# Patient Record
Sex: FEMALE | Race: WHITE | NOT HISPANIC OR LATINO | Employment: FULL TIME | URBAN - METROPOLITAN AREA
[De-identification: names, ages, dates, MRNs, and addresses within clinical notes are randomized per-mention and may not be internally consistent; named-entity substitution may affect disease eponyms.]

---

## 2022-02-25 ENCOUNTER — OFFICE VISIT (OUTPATIENT)
Dept: OBSTETRICS AND GYNECOLOGY | Facility: CLINIC | Age: 32
End: 2022-02-25

## 2022-02-25 VITALS
HEIGHT: 69 IN | BODY MASS INDEX: 16.59 KG/M2 | HEART RATE: 93 BPM | SYSTOLIC BLOOD PRESSURE: 107 MMHG | WEIGHT: 112 LBS | DIASTOLIC BLOOD PRESSURE: 69 MMHG

## 2022-02-25 DIAGNOSIS — N93.9 ABNORMAL UTERINE BLEEDING (AUB): Primary | ICD-10-CM

## 2022-02-25 DIAGNOSIS — R30.0 DYSURIA: ICD-10-CM

## 2022-02-25 DIAGNOSIS — Z30.013 ENCOUNTER FOR INITIAL PRESCRIPTION OF INJECTABLE CONTRACEPTIVE: ICD-10-CM

## 2022-02-25 DIAGNOSIS — N94.6 DYSMENORRHEA: ICD-10-CM

## 2022-02-25 LAB
BILIRUB BLD-MCNC: ABNORMAL MG/DL
C TRACH RRNA CVX QL NAA+PROBE: NOT DETECTED
CLARITY, POC: CLEAR
COLOR UR: ABNORMAL
GLUCOSE UR STRIP-MCNC: NEGATIVE MG/DL
KETONES UR QL: ABNORMAL
LEUKOCYTE EST, POC: ABNORMAL
N GONORRHOEA RRNA SPEC QL NAA+PROBE: NOT DETECTED
NITRITE UR-MCNC: POSITIVE MG/ML
PH UR: 5 [PH] (ref 5–8)
PROT UR STRIP-MCNC: ABNORMAL MG/DL
RBC # UR STRIP: ABNORMAL /UL
SP GR UR: 1.02 (ref 1–1.03)
UROBILINOGEN UR QL: NORMAL

## 2022-02-25 PROCEDURE — 84702 CHORIONIC GONADOTROPIN TEST: CPT | Performed by: NURSE PRACTITIONER

## 2022-02-25 PROCEDURE — 87591 N.GONORRHOEAE DNA AMP PROB: CPT | Performed by: NURSE PRACTITIONER

## 2022-02-25 PROCEDURE — 87491 CHLMYD TRACH DNA AMP PROBE: CPT | Performed by: NURSE PRACTITIONER

## 2022-02-25 PROCEDURE — 99204 OFFICE O/P NEW MOD 45 MIN: CPT | Performed by: NURSE PRACTITIONER

## 2022-02-25 RX ORDER — BUSPIRONE HYDROCHLORIDE 5 MG/1
TABLET ORAL
COMMUNITY

## 2022-02-25 RX ORDER — OMEPRAZOLE 40 MG/1
CAPSULE, DELAYED RELEASE ORAL
COMMUNITY

## 2022-02-25 RX ORDER — NITROFURANTOIN 25; 75 MG/1; MG/1
100 CAPSULE ORAL 2 TIMES DAILY
Qty: 10 CAPSULE | Refills: 0 | Status: SHIPPED | OUTPATIENT
Start: 2022-02-25 | End: 2022-03-02

## 2022-02-25 RX ORDER — MEDROXYPROGESTERONE ACETATE 150 MG/ML
150 INJECTION, SUSPENSION INTRAMUSCULAR
Qty: 1 EACH | Refills: 3 | Status: SHIPPED | OUTPATIENT
Start: 2022-02-25 | End: 2023-02-07 | Stop reason: SDUPTHER

## 2022-02-25 NOTE — PROGRESS NOTES
"GYN Problem/Follow Up Visit    Chief Complaint   Patient presents with   • Menstrual Problem     irreg, long menses, burning           HPI  Lakesha Mccall is a 31 y.o. female, , who presents for menses frequent and lasting longer for past 9 months.   Menses q 14- 30 days, lasting 5-9, change products q 3 hours- super tampon, on heaviest days, Menstrual cramps increasing. Desires contraception.    Additional OB/GYN History   Patient's last menstrual period was 2022 (exact date).  Current contraception: contraceptive methods: Condoms  Desires to: start contraception  Allergies : Lexapro [escitalopram]     The additional following portions of the patient's history were reviewed and updated as appropriate: allergies, current medications, past family history, past medical history, past social history, past surgical history and problem list.    Review of Systems    I have reviewed and agree with the HPI, ROS, and historical information as entered above. Dionna Helms, APRN    Objective   /69   Pulse 93   Ht 175.3 cm (69\")   Wt 50.8 kg (112 lb)   LMP 2022 (Exact Date)   BMI 16.54 kg/m²     Physical Exam  Exam conducted with a chaperone present.   Constitutional:       Appearance: Normal appearance.   Cardiovascular:      Rate and Rhythm: Normal rate and regular rhythm.   Genitourinary:     General: Normal vulva.      Vagina: Normal.      Cervix: Normal.      Uterus: Normal.       Adnexa: Right adnexa normal and left adnexa normal.      Comments: menstruating  Skin:     General: Skin is warm and dry.   Neurological:      Mental Status: She is alert.          Assessment and Plan    Diagnoses and all orders for this visit:    1. Abnormal uterine bleeding (AUB) (Primary)  -     Chlamydia trachomatis, Neisseria gonorrhoeae, PCR - Swab, Cervix  -     HCG, B-subunit, Quantitative    2. Dysmenorrhea    3. Dysuria  -     POC Urinalysis Dipstick  -     Urine Culture - Urine, Urine, Clean Catch    4. " Encounter for initial prescription of injectable contraceptive  -     HCG, B-subunit, Quantitative    Other orders  -     medroxyPROGESTERone (DEPO-PROVERA) 150 MG/ML injection; Inject 1 mL into the appropriate muscle as directed by prescriber Every 3 (Three) Months.  Dispense: 1 each; Refill: 3  -     nitrofurantoin, macrocrystal-monohydrate, (Macrobid) 100 MG capsule; Take 1 capsule by mouth 2 (Two) Times a Day for 5 days.  Dispense: 10 capsule; Refill: 0      Color   Date Value Ref Range Status   02/25/2022 Kristi Yellow, Straw, Dark Yellow, Kristi Final     Clarity, UA   Date Value Ref Range Status   02/25/2022 Clear Clear Final     Specific Gravity    Date Value Ref Range Status   02/25/2022 1.020 (A) 1.005 - 1.030 Final     pH, Urine   Date Value Ref Range Status   02/25/2022 5.0 5.0 - 8.0 Final     Leukocytes   Date Value Ref Range Status   02/25/2022 Large (3+) (A) Negative Final     Nitrite, UA   Date Value Ref Range Status   02/25/2022 Positive (A) Negative Final     Protein, POC   Date Value Ref Range Status   02/25/2022 Trace (A) Negative mg/dL Final     Glucose, UA   Date Value Ref Range Status   02/25/2022 Negative Negative, 1000 mg/dL (3+) mg/dL Final     Ketones, UA   Date Value Ref Range Status   02/25/2022 1+ (A) Negative Final     Urobilinogen, UA   Date Value Ref Range Status   02/25/2022 Normal Normal Final     Bilirubin   Date Value Ref Range Status   02/25/2022 Small (1+) (A) Negative Final     Blood, UA   Date Value Ref Range Status   02/25/2022 3+ (A) Negative Final        Counseling:  • All BC Options R/B/A/SE/E of each  • Safe Sex/Condoms  • OCP/Hormone use risk TERRENCE   • No intercourse until after depo injection Monday, Pt lives 1 hour away. Counseled to avoid intercourse after BHCG drawn today.  • Consider pelvic ultrasound for persistent or worsening menstrual cramps        She understands the importance of having the above orders performed in a timely fashion.  The risks of not performing  them include, but are not limited to, cancer and/or subsequent increase in morbidity and/or mortality.  She is encouraged to review her results online and/or contact or office if she has questions.     Follow Up:  Return for well woman exam.        Dionna Helms, MILAGRO  02/25/2022

## 2022-02-27 LAB — HCG INTACT+B SERPL-ACNC: <1 MIU/ML

## 2022-02-28 ENCOUNTER — TELEPHONE (OUTPATIENT)
Dept: OBSTETRICS AND GYNECOLOGY | Facility: CLINIC | Age: 32
End: 2022-02-28

## 2022-02-28 ENCOUNTER — CLINICAL SUPPORT (OUTPATIENT)
Dept: OBSTETRICS AND GYNECOLOGY | Facility: CLINIC | Age: 32
End: 2022-02-28

## 2022-02-28 DIAGNOSIS — Z30.42 ON DEPO-PROVERA FOR CONTRACEPTION: Primary | ICD-10-CM

## 2022-02-28 PROCEDURE — 96372 THER/PROPH/DIAG INJ SC/IM: CPT | Performed by: NURSE PRACTITIONER

## 2022-02-28 RX ORDER — MEDROXYPROGESTERONE ACETATE 150 MG/ML
150 INJECTION, SUSPENSION INTRAMUSCULAR ONCE
Status: COMPLETED | OUTPATIENT
Start: 2022-02-28 | End: 2022-02-28

## 2022-02-28 RX ADMIN — MEDROXYPROGESTERONE ACETATE 150 MG: 150 INJECTION, SUSPENSION INTRAMUSCULAR at 16:50

## 2022-02-28 NOTE — TELEPHONE ENCOUNTER
Patient called this pm.  She states was seen on 2-25-22 and had lab work done and received a Rx for Depo.  She was told to come back today and get the shot.  Patient states no way she can make it here today because of work.  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  Can she have someone give her the shot?

## 2022-02-28 NOTE — TELEPHONE ENCOUNTER
If pt is not going to be able to come in for depo injections please cancel refills at pharmacy. May also schedule a fu to discuss alternative treatment that does not require q 3 month injections if that works better for her.

## 2022-02-28 NOTE — PROGRESS NOTES
Patient verifies she has had no intercourse since BHCG done on 2/25/22.  Patient signed consent that she agrees to medroxyprogesterone injection.

## 2022-05-27 ENCOUNTER — CLINICAL SUPPORT (OUTPATIENT)
Dept: OBSTETRICS AND GYNECOLOGY | Facility: CLINIC | Age: 32
End: 2022-05-27

## 2022-05-27 DIAGNOSIS — Z30.42 SURVEILLANCE OF CONTRACEPTIVE INJECTION: Primary | ICD-10-CM

## 2022-05-27 PROCEDURE — 96372 THER/PROPH/DIAG INJ SC/IM: CPT | Performed by: NURSE PRACTITIONER

## 2022-05-27 RX ORDER — MEDROXYPROGESTERONE ACETATE 150 MG/ML
150 INJECTION, SUSPENSION INTRAMUSCULAR ONCE
Status: COMPLETED | OUTPATIENT
Start: 2022-05-27 | End: 2022-05-27

## 2022-05-27 RX ADMIN — MEDROXYPROGESTERONE ACETATE 150 MG: 150 INJECTION, SUSPENSION INTRAMUSCULAR at 16:00

## 2022-05-27 NOTE — PROGRESS NOTES
Pt received Depo today  Administered in Right Deltoid  Due between: August 12-26, 2022  Pap Due: Informed pt to schedule WWE

## 2022-08-26 ENCOUNTER — CLINICAL SUPPORT (OUTPATIENT)
Dept: OBSTETRICS AND GYNECOLOGY | Facility: CLINIC | Age: 32
End: 2022-08-26

## 2022-08-26 DIAGNOSIS — Z30.42 SURVEILLANCE OF CONTRACEPTIVE INJECTION: Primary | ICD-10-CM

## 2022-08-26 PROCEDURE — 96372 THER/PROPH/DIAG INJ SC/IM: CPT | Performed by: NURSE PRACTITIONER

## 2022-08-26 RX ORDER — MEDROXYPROGESTERONE ACETATE 150 MG/ML
150 INJECTION, SUSPENSION INTRAMUSCULAR
Status: COMPLETED | OUTPATIENT
Start: 2022-08-26 | End: 2022-08-26

## 2022-08-26 RX ADMIN — MEDROXYPROGESTERONE ACETATE 150 MG: 150 INJECTION, SUSPENSION INTRAMUSCULAR at 15:44

## 2022-08-26 NOTE — PROGRESS NOTES
Patient received Depo today  Administered in Left Deltoid  Next injection due between:NOv 11th-25th   Beta HCG: was done and was NEGATIVE   Urine HCG:was not done  [place] in the last two weeks(NA if pt has been receiving depo):not applicable  Last yearly exam/Last Pap Smear: Informed Pt. To schedule WWE

## 2022-11-18 ENCOUNTER — CLINICAL SUPPORT (OUTPATIENT)
Dept: OBSTETRICS AND GYNECOLOGY | Facility: CLINIC | Age: 32
End: 2022-11-18

## 2022-11-18 DIAGNOSIS — Z30.42 SURVEILLANCE OF CONTRACEPTIVE INJECTION: Primary | ICD-10-CM

## 2022-11-18 PROCEDURE — 96372 THER/PROPH/DIAG INJ SC/IM: CPT | Performed by: NURSE PRACTITIONER

## 2022-11-18 RX ORDER — MEDROXYPROGESTERONE ACETATE 150 MG/ML
150 INJECTION, SUSPENSION INTRAMUSCULAR
Status: SHIPPED | OUTPATIENT
Start: 2022-11-18

## 2022-11-18 RX ADMIN — MEDROXYPROGESTERONE ACETATE 150 MG: 150 INJECTION, SUSPENSION INTRAMUSCULAR at 16:07

## 2022-11-18 NOTE — PROGRESS NOTES
Patient received Depo today  Administered in Right Deltoid  Next injection due between: Feb 3rd - Feb 17th  Beta HCG: was not done  Urine HCG:was not done  [place] in the last two weeks(NA if pt has been receiving depo):not applicable  Last yearly exam/Last Pap Smear: UNKNOWN.

## 2022-12-16 ENCOUNTER — OFFICE VISIT (OUTPATIENT)
Dept: OBSTETRICS AND GYNECOLOGY | Facility: CLINIC | Age: 32
End: 2022-12-16

## 2022-12-16 VITALS
SYSTOLIC BLOOD PRESSURE: 102 MMHG | HEIGHT: 69 IN | WEIGHT: 120 LBS | DIASTOLIC BLOOD PRESSURE: 76 MMHG | BODY MASS INDEX: 17.77 KG/M2

## 2022-12-16 DIAGNOSIS — Z01.419 WELL WOMAN EXAM WITH ROUTINE GYNECOLOGICAL EXAM: Primary | ICD-10-CM

## 2022-12-16 DIAGNOSIS — Z80.3 FAMILY HISTORY OF BREAST CANCER: ICD-10-CM

## 2022-12-16 PROCEDURE — 99385 PREV VISIT NEW AGE 18-39: CPT | Performed by: OBSTETRICS & GYNECOLOGY

## 2022-12-16 PROCEDURE — 87624 HPV HI-RISK TYP POOLED RSLT: CPT | Performed by: OBSTETRICS & GYNECOLOGY

## 2022-12-16 PROCEDURE — G0123 SCREEN CERV/VAG THIN LAYER: HCPCS | Performed by: OBSTETRICS & GYNECOLOGY

## 2022-12-16 RX ORDER — CEFDINIR 300 MG/1
1 CAPSULE ORAL EVERY 12 HOURS
COMMUNITY

## 2022-12-16 RX ORDER — ONDANSETRON 4 MG/1
1 TABLET, ORALLY DISINTEGRATING ORAL EVERY 8 HOURS SCHEDULED
COMMUNITY

## 2022-12-29 LAB
CYTOLOGIST CVX/VAG CYTO: ABNORMAL
CYTOLOGY CVX/VAG DOC CYTO: ABNORMAL
CYTOLOGY CVX/VAG DOC THIN PREP: ABNORMAL
DX ICD CODE: ABNORMAL
DX ICD CODE: ABNORMAL
HIV 1 & 2 AB SER-IMP: ABNORMAL
HPV I/H RISK 4 DNA CVX QL PROBE+SIG AMP: POSITIVE
OTHER STN SPEC: ABNORMAL
PATHOLOGIST CVX/VAG CYTO: ABNORMAL
STAT OF ADQ CVX/VAG CYTO-IMP: ABNORMAL

## 2022-12-30 ENCOUNTER — TELEPHONE (OUTPATIENT)
Dept: OBSTETRICS AND GYNECOLOGY | Facility: CLINIC | Age: 32
End: 2022-12-30

## 2022-12-30 NOTE — TELEPHONE ENCOUNTER
Tried to call the patient to discuss her pap smear results. She did not answer and voicemail was not set up.

## 2022-12-30 NOTE — TELEPHONE ENCOUNTER
----- Message from Renea Anna sent at 8/11/2021  7:51 AM EDT -----  Subject: Refill Request    QUESTIONS  Name of Medication? metoprolol succinate (TOPROL XL) 25 MG extended   release tablet  Patient-reported dosage and instructions? Take 1 tablet by mouth daily -   Oral  How many days do you have left? 0  Preferred Pharmacy? [facility]. Pharmacy phone number (if available)? [phone]  ---------------------------------------------------------------------------  --------------,  Name of Medication? montelukast (SINGULAIR) 10 MG tablet  Patient-reported dosage and instructions? 1 tablet by mouth at bedtime  How many days do you have left? 0  Preferred Pharmacy? [facility]. Pharmacy phone number (if available)? [phone]  ---------------------------------------------------------------------------  --------------,  Name of Medication? rOPINIRole (REQUIP) 0.25 MG tablet  Patient-reported dosage and instructions? Take 1 tab in morning and 2 tabs   before bed  How many days do you have left? 0  Preferred Pharmacy? [facility]. Pharmacy phone number (if available)? [phone]  ---------------------------------------------------------------------------  --------------  CALL BACK INFO  What is the best way for the office to contact you? OK to leave message on   voicemail  Preferred Call Back Phone Number?  [phone] ----- Message from Sushma Chairez DO sent at 12/30/2022  8:19 AM EST -----  Abnormal pap smear, will need set up for colposcopy.     Electronically signed by:    Sushma Chairez DO  12/30/22  08:19 EST      ----- Message -----  From: Lab, Background User  Sent: 12/29/2022   2:08 PM EST  To: Sushma Chairez DO

## 2022-12-30 NOTE — TELEPHONE ENCOUNTER
Called the patient back and advised her of the results. She is scheduled to see Dr. Chairez for the colpo exam.

## 2023-01-03 LAB — REF LAB TEST METHOD: NORMAL

## 2023-01-20 ENCOUNTER — PROCEDURE VISIT (OUTPATIENT)
Dept: OBSTETRICS AND GYNECOLOGY | Facility: CLINIC | Age: 33
End: 2023-01-20
Payer: COMMERCIAL

## 2023-01-20 VITALS
HEART RATE: 91 BPM | BODY MASS INDEX: 17.66 KG/M2 | DIASTOLIC BLOOD PRESSURE: 78 MMHG | WEIGHT: 119.6 LBS | SYSTOLIC BLOOD PRESSURE: 112 MMHG

## 2023-01-20 DIAGNOSIS — R87.612 PAPANICOLAOU SMEAR OF CERVIX WITH LOW GRADE SQUAMOUS INTRAEPITHELIAL LESION (LGSIL): Primary | ICD-10-CM

## 2023-01-20 PROCEDURE — 88305 TISSUE EXAM BY PATHOLOGIST: CPT | Performed by: OBSTETRICS & GYNECOLOGY

## 2023-01-20 PROCEDURE — 57456 ENDOCERV CURETTAGE W/SCOPE: CPT | Performed by: OBSTETRICS & GYNECOLOGY

## 2023-01-20 NOTE — PROGRESS NOTES
Colposcopy    CC:  Pt presents for colposcopy  Chief Complaint   Patient presents with   • Colposcopy       Pregnant: No  Pap result: LGSIL, HPV HIGH RISK POSITIVE- non specified type  Uhcg:  Negative  Consent signed: Yes    Procedure reviewed in detail.  She understands the potential risks include, but are not limited to, pain, bleeding, and infection.  Her questions have been answered.    Subjective/HPI:Patient is a 32 y.o. female with abnormal pap smear results     Objective:  /78   Pulse 91   Wt 54.3 kg (119 lb 9.6 oz)   BMI 17.66 kg/m²   External genitalia- Without lesion Vulvar Colposcopy: Vinegar solution applied to vulva No lesions  Perineum- Without lesion  Vagina- Without lesion Vaginal Colposcopy: Vinegar solution and lugols used to stain vagina, speculum rotated to allow visualization of entire vagina No lesions  Cvx- No lesions seen, ECC performed  Physical Exam  Patient tolerated the procedure well    Findings:  no visible lesions, no mosaicism and no punctation    Assessment and Plan:  Diagnoses and all orders for this visit:    1. Papanicolaou smear of cervix with low grade squamous intraepithelial lesion (LGSIL) (Primary)        Counseling:    We discussed HPV in detail.  Incidence, transmission, course, remission, progression, types, cervical cancer, genital warts, monitoring, and importance of compliance were reviewed.  A HPV handout was provided if she desired, HPV vaccine was discussed and recommended if appropriate.  Written information was made available.  The vaccine protects against the 2 types of HPV that cause 70% of cervical cancer and 90% of genital warts.  Condoms were recommended    She understands the need for continued follow up until she is cleared to return to routine screening pap smears.  She understands the importance of follow up and consequences with lack of follow up (i.e. potential for cervical cancer).  Follow up usually ranges every 6months - 12months.       PRECAUTIONS - It is common to have bright red spotting and dark discharge after today.  If she has had cervical biopsies, she is to avoid intercourse or tampons as directed for 7 days.  She can use OTC pain relievers prn.  She needs to return to office or ER (if after hours/weekends) if she has pelvic pain, bleeding > 1 pad/2 hours, discharge that has a bad vaginal odor or vaginal itching, fever > 101.5, or any other concerns.      I spent 20 minutes on the separately reported service of colposcopy. This time is not included in the time used to support the E/M service also reported today.    Follow Up:  Return in about 1 year (around 1/20/2024) for Annual exam.      Sushma Chairez DO  01/20/2023    [facility]  [address] [place] [address]  Dept: [phone]  Dept Fax: [phone]  Loc: [phone]  Loc Fax: [phone]

## 2023-01-24 LAB
CYTO UR: NORMAL
LAB AP CASE REPORT: NORMAL
LAB AP CLINICAL INFORMATION: NORMAL
PATH REPORT.FINAL DX SPEC: NORMAL
PATH REPORT.GROSS SPEC: NORMAL

## 2023-02-01 ENCOUNTER — TELEPHONE (OUTPATIENT)
Dept: OBSTETRICS AND GYNECOLOGY | Facility: CLINIC | Age: 33
End: 2023-02-01
Payer: COMMERCIAL

## 2023-02-01 NOTE — TELEPHONE ENCOUNTER
Patient called in with complaints of a fishy vaginal order that started last weekend. She is still having still having some brown spotting after the colpo exam she had performed. She is requesting a prescription sent to the pharmacy on file. Please advise.

## 2023-02-07 DIAGNOSIS — Z30.42 ON DEPO-PROVERA FOR CONTRACEPTION: Primary | ICD-10-CM

## 2023-02-07 RX ORDER — MEDROXYPROGESTERONE ACETATE 150 MG/ML
150 INJECTION, SUSPENSION INTRAMUSCULAR
Qty: 1 EACH | Refills: 3 | Status: SHIPPED | OUTPATIENT
Start: 2023-02-07

## 2023-02-07 NOTE — TELEPHONE ENCOUNTER
Patient called this am.  She is requesting a refill on Depo.  Last seen 12/16/22.  No Rx was given @ that time.  Next appointment 12/18/23.  I have attached an order.

## 2023-02-10 ENCOUNTER — CLINICAL SUPPORT (OUTPATIENT)
Dept: OBSTETRICS AND GYNECOLOGY | Facility: CLINIC | Age: 33
End: 2023-02-10
Payer: COMMERCIAL

## 2023-02-10 DIAGNOSIS — Z30.42 SURVEILLANCE OF CONTRACEPTIVE INJECTION: Primary | ICD-10-CM

## 2023-02-10 PROCEDURE — 96372 THER/PROPH/DIAG INJ SC/IM: CPT | Performed by: OBSTETRICS & GYNECOLOGY

## 2023-02-10 RX ORDER — MEDROXYPROGESTERONE ACETATE 150 MG/ML
150 INJECTION, SUSPENSION INTRAMUSCULAR
Status: SHIPPED | OUTPATIENT
Start: 2023-02-10

## 2023-02-10 RX ADMIN — MEDROXYPROGESTERONE ACETATE 150 MG: 150 INJECTION, SUSPENSION INTRAMUSCULAR at 15:51

## 2023-02-10 NOTE — PROGRESS NOTES
Patient received Depo today  Administered in Left Deltoid  Next injection due between: April 28th - May 12th  Beta HCG: was not done  Urine HCG:was not done  [place] in the last two weeks(NA if pt has been receiving depo):not applicable  Last yearly exam/Last Pap Smear: 12- LSIL HPV Pos

## 2023-05-12 ENCOUNTER — CLINICAL SUPPORT (OUTPATIENT)
Dept: OBSTETRICS AND GYNECOLOGY | Facility: CLINIC | Age: 33
End: 2023-05-12
Payer: COMMERCIAL

## 2023-05-12 DIAGNOSIS — Z30.42 ENCOUNTER FOR MANAGEMENT AND INJECTION OF DEPO-PROVERA: ICD-10-CM

## 2023-05-12 RX ADMIN — MEDROXYPROGESTERONE ACETATE 150 MG: 150 INJECTION, SUSPENSION INTRAMUSCULAR at 15:54

## 2023-05-12 NOTE — PROGRESS NOTES
Patient received Depo today  Administered in Right  Deltoid  Next injection due between:July 28 and Aug 11  Beta HCG: was not done  Urine HCG:was not done  [place] in the last two weeks(NA if pt has been receiving depo):not applicable pt receiving depo   Last yearly exam/Last Pap Smear: 12- LSIL HPV Pos

## 2023-08-11 ENCOUNTER — CLINICAL SUPPORT (OUTPATIENT)
Dept: OBSTETRICS AND GYNECOLOGY | Facility: CLINIC | Age: 33
End: 2023-08-11
Payer: COMMERCIAL

## 2023-08-11 DIAGNOSIS — Z30.42 SURVEILLANCE FOR DEPO-PROVERA CONTRACEPTION: Primary | ICD-10-CM

## 2023-08-11 RX ADMIN — MEDROXYPROGESTERONE ACETATE 150 MG: 150 INJECTION, SUSPENSION INTRAMUSCULAR at 15:49

## 2023-08-11 NOTE — PROGRESS NOTES
Patient received Depo today  Administered in Left  Deltoid, Pt tensed up as the injection was given and jerked to right, Depo was fully administered, No adverse reaction   Next injection due between: October 27- November 10th, 2023  Beta HCG: was not done  Urine HCG:was not done  [place] in the last two weeks(NA if pt has been receiving depo):not applicable  Last yearly exam/Last Pap Smear: 12/16/22 LSIL HPV Positive

## 2023-11-07 ENCOUNTER — TELEPHONE (OUTPATIENT)
Dept: OBSTETRICS AND GYNECOLOGY | Facility: CLINIC | Age: 33
End: 2023-11-07
Payer: COMMERCIAL

## 2023-11-07 NOTE — TELEPHONE ENCOUNTER
Patient informed of last shot & her window.  No longer has insurance going to contact [facility] business office & pharmacy to see how much she would have to pay then call us back.

## 2023-11-07 NOTE — TELEPHONE ENCOUNTER
Patient called wanting to know last Depo.  Returned patient call no answer.  Last shot 8/11/23.  Window for shot 10/27 thru 11/10

## 2023-11-07 NOTE — TELEPHONE ENCOUNTER
Caller:     Relationship:     Best call back number: [phone]    What is the best time to reach you: ANYTIME    Who are you requesting to speak with (clinical staff, provider,  specific staff member): NURSE        PATIENT CALLED IN TO PRACTICE WANTING TO KNOW HOW LONG SHE CAN WAIT BEFORE GETTING HER NEXT DEPO SHOT.